# Patient Record
Sex: MALE | Race: WHITE
[De-identification: names, ages, dates, MRNs, and addresses within clinical notes are randomized per-mention and may not be internally consistent; named-entity substitution may affect disease eponyms.]

---

## 2018-04-24 ENCOUNTER — HOSPITAL ENCOUNTER (EMERGENCY)
Dept: HOSPITAL 45 - C.EDB | Age: 78
Discharge: HOME | End: 2018-04-24
Payer: COMMERCIAL

## 2018-04-24 VITALS
HEIGHT: 67.99 IN | HEIGHT: 67.99 IN | BODY MASS INDEX: 28.23 KG/M2 | BODY MASS INDEX: 28.23 KG/M2 | WEIGHT: 186.29 LBS | WEIGHT: 186.29 LBS

## 2018-04-24 VITALS
OXYGEN SATURATION: 92 % | HEART RATE: 88 BPM | TEMPERATURE: 97.52 F | DIASTOLIC BLOOD PRESSURE: 83 MMHG | SYSTOLIC BLOOD PRESSURE: 149 MMHG

## 2018-04-24 VITALS — OXYGEN SATURATION: 84 %

## 2018-04-24 DIAGNOSIS — Z87.891: ICD-10-CM

## 2018-04-24 DIAGNOSIS — I48.91: ICD-10-CM

## 2018-04-24 DIAGNOSIS — Z79.899: ICD-10-CM

## 2018-04-24 DIAGNOSIS — I95.9: ICD-10-CM

## 2018-04-24 DIAGNOSIS — J44.9: ICD-10-CM

## 2018-04-24 DIAGNOSIS — N40.0: ICD-10-CM

## 2018-04-24 DIAGNOSIS — R09.02: Primary | ICD-10-CM

## 2018-04-24 LAB
ALBUMIN SERPL-MCNC: 3.1 GM/DL (ref 3.4–5)
ALP SERPL-CCNC: 133 U/L (ref 45–117)
ALT SERPL-CCNC: 24 U/L (ref 12–78)
AST SERPL-CCNC: 18 U/L (ref 15–37)
BASOPHILS # BLD: 0.02 K/UL (ref 0–0.2)
BASOPHILS NFR BLD: 0.2 %
BUN SERPL-MCNC: 35 MG/DL (ref 7–18)
CALCIUM SERPL-MCNC: 8.8 MG/DL (ref 8.5–10.1)
CO2 SERPL-SCNC: 37 MMOL/L (ref 21–32)
CREAT SERPL-MCNC: 1.79 MG/DL (ref 0.6–1.4)
EOS ABS #: 0.21 K/UL (ref 0–0.5)
EOSINOPHIL NFR BLD AUTO: 251 K/UL (ref 130–400)
GLUCOSE SERPL-MCNC: 127 MG/DL (ref 70–99)
HCT VFR BLD CALC: 39.5 % (ref 42–52)
HGB BLD-MCNC: 12.3 G/DL (ref 14–18)
IG#: 0.04 K/UL (ref 0–0.02)
IMM GRANULOCYTES NFR BLD AUTO: 12.6 %
INR PPP: 3.5 (ref 0.9–1.1)
LIPASE: 107 U/L (ref 73–393)
LYMPHOCYTES # BLD: 1.48 K/UL (ref 1.2–3.4)
MCH RBC QN AUTO: 29.8 PG (ref 25–34)
MCHC RBC AUTO-ENTMCNC: 31.1 G/DL (ref 32–36)
MCV RBC AUTO: 95.6 FL (ref 80–100)
MONO ABS #: 0.9 K/UL (ref 0.11–0.59)
MONOCYTES NFR BLD: 7.7 %
NEUT ABS #: 9.05 K/UL (ref 1.4–6.5)
NEUTROPHILS # BLD AUTO: 1.8 %
NEUTROPHILS NFR BLD AUTO: 77.4 %
PMV BLD AUTO: 10.1 FL (ref 7.4–10.4)
POTASSIUM SERPL-SCNC: 3.6 MMOL/L (ref 3.5–5.1)
PROT SERPL-MCNC: 6.8 GM/DL (ref 6.4–8.2)
PTT PATIENT: 36.3 SECONDS (ref 21–31)
RED CELL DISTRIBUTION WIDTH CV: 14.8 % (ref 11.5–14.5)
RED CELL DISTRIBUTION WIDTH SD: 51.9 FL (ref 36.4–46.3)
SODIUM SERPL-SCNC: 140 MMOL/L (ref 136–145)
WBC # BLD AUTO: 11.7 K/UL (ref 4.8–10.8)

## 2018-04-24 NOTE — EMERGENCY ROOM VISIT NOTE
History


Report prepared by Donna:  Faizan Guzmán


Under the Supervision of:  Dr. Marlon Rogers M.D.


First contact with patient:  16:53


Stated Complaint:  HYPOTENSION, HYPOXIA,





History of Present Illness


The patient is a 78 year old male with a history of a heart attack, COPD, and 

atrial fibrillation who presents to the Emergency Room with complaints of 

persistent abdominal pain that started a week ago. He states that the pain is 

in the lower region of his abdomen, and he describes it as a "sickening pain". 

He also describes the pain as an achiness. The patient adds that he has had 

some dizzy and lightheaded spells today, and [facility], and was told that he had a fever. He was also told that he was 

hypotensive, and the patient was then sent here for evaluation. He says that he 

has had black stool intermittently, and he has noted some blood in his stool at 

times but does not remember when. The patient denies any loss of consciousness, 

or vomiting. He also denies any chest pain or worsening shortness of breath. He 

notes that he is on Coumadin. The patient states that he is on 2 to 4 liters of 

oxygen normally. He says that he no longer smokes.





   Source of History:  patient


   Onset:  A week ago


   Position:  abdomen (lower)


   Quality:  ache


   Timing:  other (persistent)


   Associated Symptoms:  + fevers, + melena, + hematochezia, No LOC, No chest 

pain, No SOB (any worsening), No vomiting


Note:


Associated symptoms: Dizzy, lightheaded today. Hypotensive.





Review of Systems


See HPI for pertinent positives & negatives. A total of 10 systems reviewed and 

were otherwise negative.





Past Medical & Surgical


Medical Problems:


(1) Afib


(2) COPD (chronic obstructive pulmonary disease)


(3) Heart attack








Family History


Family history omitted secondary to patient's advanced age.





Social History


Smoking Status:  Former Smoker


Smokeless Tobacco Use:  No


Drug Use:  none


Occupation Status:  retired





Physical Exam


Vital Signs











  Date Time  Temp Pulse Resp B/P (MAP) Pulse Ox O2 Delivery O2 Flow Rate FiO2


 


4/24/18 22:46 36.4 88 23 149/83 92   


 


4/24/18 20:09  79 22 158/93    


 


4/24/18 19:03  77 28 137/90 93 Oxymask 5.0 


 


4/24/18 18:46  76 27  98   


 


4/24/18 18:31    137/90    


 


4/24/18 18:16  76 34  98   


 


4/24/18 18:02    129/83    


 


4/24/18 17:56    125/79    


 


4/24/18 17:46  76 23  96   


 


4/24/18 17:16  73 30  94   


 


4/24/18 17:02  77      


 


4/24/18 16:59     84 Nasal Cannula 3.0 


 


4/24/18 16:55 36.4 84 22 110/65 91 Oxymask 3.0 


 


4/24/18 16:47    110/65    











Physical Exam





Constitutional: Vital signs reviewed.


Eyes: Pupils are equal round reactive to light.  Conjunctiva are noninjected.  


ENT: Pharynx is clear without erythema or exudate.  Mucous membranes are moist.

  Neck supple without meningeal signs.


Respiratory: Poor air entry bilaterally. No wheezes or rales. Breath sounds are 

equal bilaterally. 


Cardiovascular: Irregularly irregular rhythm. No rubs or gallops.


GI: Suprapubic left lower quadrant tenderness. No guarding. Soft, nondistended. 

Bowel sounds are present.


Rectal: Guaiac negative brown stool.


Musculoskeletal: No peripheral edema.  No lower extremity tenderness. 


Integumentary: No cyanosis.


Neurological: The patient is awake and alert.  No focal deficits.


Psychiatric: Normal affect.





Medical Decision & Procedures


ER Provider


Diagnostic Interpretation:


Radiology results as stated below per my review and the radiologist's 

interpretation:





CT SCAN OF THE ABDOMEN AND PELVIS WITHOUT CONTRAST





CLINICAL HISTORY: lower abd pain HYPERTENSION





COMPARISON STUDY:  No previous studies for comparison. 





TECHNIQUE: CT scan of the abdomen and pelvis was performed from the lung bases


to the proximal femurs. Images are reviewed in the axial, sagittal, and coronal


planes. IV contrast was not administered for this examination.  A dose lowering


technique was utilized adhering to the principles of ALARA.








CT DOSE: 621.81 mGy.cm


FINDINGS:





Lower chest: There is pulmonary emphysema with multiple left lower lobe lung


cysts. There is a left lower lobe pleural mass versus lobular pleural thickening


measuring 3.5 cm. There is a small left pleural effusion. A nonemergent


dedicated chest CT scan is recommended in follow-up.





Liver: The unenhanced liver is normal in size, contour, and attenuation. There


is no intrahepatic biliary ductal dilatation.





Gallbladder: Unremarkable.





Spleen: Normal in size and attenuation.





Pancreas: Unremarkable.





Adrenal glands: There is a low-density 3.5 cm left adrenal mass consistent with


an adenoma. There is a 23 mm left adrenal gland adenoma





Kidneys: There are bilateral vascular calcifications. No renal calculi are


visualized. There is no hydronephrosis. No ureteral calculi are delineated.





Bowel: There are no transition zones indicate bowel obstruction. There is


colonic diverticulosis. There is no evidence of acute diverticulitis. By history


the appendix is surgically absent.





Peritoneum: There is no intraperitoneal free air or abdominal ascites.





Vasculature: Atheromatous changes are present within the aorta. There is a 3.3


cm infrarenal abdominal aortic aneurysm.





Adenopathy: None.





Pelvic viscera: The prostate is enlarged.





Skeletal structures: There is evidence for prior T12 and L2 vertebroplasty's





IMPRESSION:  


1. No evidence of bowel obstruction. No evidence of free air


2. No renal ureteral or bladder calculi identified


3. Diverticulosis. No evidence of acute diverticulitis


4. Prostamegaly


5. Bilateral adrenal adenomas


6. 3.3 cm infrarenal abdominal aortic aneurysm.


7. Emphysema with lower lobe cystic change.


8. 3.5 cm left lower lobe pleural mass versus focal pleural thickening. A


nonemergent dedicated contrast-enhanced chest CT is recommended in follow-up








Electronically signed by:  Russell Noble M.D.


4/24/2018 7:08 PM





Dictated Date/Time:  4/24/2018 7:00 PM











CHEST 2 VIEWS ROUTINE





CLINICAL HISTORY: Cough    





COMPARISON STUDY:  No previous studies for comparison.





FINDINGS: There is severe pulmonary emphysema. There are age-indeterminate


interstitial basilar opacities left greater than right. There is a 12 mm right


upper lung zone nodule and an area of fibrotic scarring.[ 





IMPRESSION: 


1. Pulmonary emphysema with age-indeterminate interstitial basilar opacities.


2. 12 mm right upper lung zone nodule in an area fibrotic scarring








Electronically signed by:  Russell Noble M.D.


4/24/2018 7:44 PM





Dictated Date/Time:  4/24/2018 7:42 PM





Laboratory Results


4/24/18 17:26








Red Blood Count 4.13, Mean Corpuscular Volume 95.6, Mean Corpuscular Hemoglobin 

29.8, Mean Corpuscular Hemoglobin Concent 31.1, Mean Platelet Volume 10.1, 

Neutrophils (%) (Auto) 77.4, Lymphocytes (%) (Auto) 12.6, Monocytes (%) (Auto) 

7.7, Eosinophils (%) (Auto) 1.8, Basophils (%) (Auto) 0.2, Neutrophils # (Auto) 

9.05, Lymphocytes # (Auto) 1.48, Monocytes # (Auto) 0.90, Eosinophils # (Auto) 

0.21, Basophils # (Auto) 0.02





4/24/18 17:26

















Test


  4/24/18


17:26 4/24/18


17:30


 


White Blood Count


  11.70 K/uL


(4.8-10.8) 


 


 


Red Blood Count


  4.13 M/uL


(4.7-6.1) 


 


 


Hemoglobin


  12.3 g/dL


(14.0-18.0) 


 


 


Hematocrit 39.5 % (42-52)  


 


Mean Corpuscular Volume


  95.6 fL


() 


 


 


Mean Corpuscular Hemoglobin


  29.8 pg


(25-34) 


 


 


Mean Corpuscular Hemoglobin


Concent 31.1 g/dl


(32-36) 


 


 


Platelet Count


  251 K/uL


(130-400) 


 


 


Mean Platelet Volume


  10.1 fL


(7.4-10.4) 


 


 


Neutrophils (%) (Auto) 77.4 %  


 


Lymphocytes (%) (Auto) 12.6 %  


 


Monocytes (%) (Auto) 7.7 %  


 


Eosinophils (%) (Auto) 1.8 %  


 


Basophils (%) (Auto) 0.2 %  


 


Neutrophils # (Auto)


  9.05 K/uL


(1.4-6.5) 


 


 


Lymphocytes # (Auto)


  1.48 K/uL


(1.2-3.4) 


 


 


Monocytes # (Auto)


  0.90 K/uL


(0.11-0.59) 


 


 


Eosinophils # (Auto)


  0.21 K/uL


(0-0.5) 


 


 


Basophils # (Auto)


  0.02 K/uL


(0-0.2) 


 


 


RDW Standard Deviation


  51.9 fL


(36.4-46.3) 


 


 


RDW Coefficient of Variation


  14.8 %


(11.5-14.5) 


 


 


Immature Granulocyte % (Auto) 0.3 %  


 


Immature Granulocyte # (Auto)


  0.04 K/uL


(0.00-0.02) 


 


 


Prothrombin Time


  36.1 SECONDS


(9.0-12.0) 


 


 


Prothromb Time International


Ratio 3.5 (0.9-1.1) 


  


 


 


Activated Partial


Thromboplast Time 36.3 SECONDS


(21.0-31.0) 


 


 


Partial Thromboplastin Ratio 1.4  


 


Anion Gap


  3.0 mmol/L


(3-11) 


 


 


Est Creatinine Clear Calc


Drug Dose 36.6 ml/min 


  


 


 


Estimated GFR (


American) 41.4 


  


 


 


Estimated GFR (Non-


American 35.8 


  


 


 


BUN/Creatinine Ratio 19.8 (10-20)  


 


Calcium Level


  8.8 mg/dl


(8.5-10.1) 


 


 


Total Bilirubin


  0.9 mg/dl


(0.2-1) 


 


 


Direct Bilirubin


  0.4 mg/dl


(0-0.2) 


 


 


Aspartate Amino Transf


(AST/SGOT) 18 U/L (15-37) 


  


 


 


Alanine Aminotransferase


(ALT/SGPT) 24 U/L (12-78) 


  


 


 


Alkaline Phosphatase


  133 U/L


() 


 


 


Total Protein


  6.8 gm/dl


(6.4-8.2) 


 


 


Albumin


  3.1 gm/dl


(3.4-5.0) 


 


 


Lipase


  107 U/L


() 


 


 


Bedside Troponin I


  


  < 0.030 ng/ml


(0-0.045)





Laboratory results as reviewed by me.





Medications Administered











 Medications


  (Trade)  Dose


 Ordered  Sig/Arian


 Route  Start Time


 Stop Time Status Last Admin


Dose Admin


 


 Albuterol/


 Ipratropium


  (Duoneb)  3 ml  NOW  STAT


 INH  4/24/18 17:06


 4/24/18 17:09 DC 4/24/18 17:51


3 ML


 


 Methylprednisolone


 Sodium Succinate


  (Solu-Medrol IV)  125 mg  NOW  STAT


 IV  4/24/18 17:06


 4/24/18 17:09 DC 4/24/18 17:51


125 MG











ECG Per My Interpretation


Indication:  other (dizzy)


Rate (beats per minute):  77


Rhythm:  atrial flutter (with 4 to 1 AV conduction)


Findings:  other (no ST elevations, no PVCs)





ED Course


1654: The patient was evaluated in room B3B. A complete history and physical 

exam was performed.





1706: Solu-Medrol  mg, DuoNeb 3 ml INH.





1926: I reevaluated the patient and he has good air entry bilaterally on lung 

examination with no wheezing. He has no complaints currently, and his abdominal 

pain is better. He has minimal tenderness in the supraumbilical region. The 

patient's blood pressure has been normal to elevated throughout his stay here. 

I discussed the test results with him. He is going to x-ray.





2025: Upon reevaluation, the patient feels better and wants to go home. I 

discussed tawnya's findings with him. He verbalized agreement of the treatment 

plan. He was discharged home.





2033: I reevaluated the patient and talked to him about his INR. He says that 

he has an appointment either tomorrow or Thursday with his doctor.





Medical Decision


This is a 78-year-old male presents with lower abdominal pain, fever and black 

stools.  Differential diagnosis includes diverticulosis, diverticulitis, abscess

, mass, GI bleed, supratherapeutic INR.  I did perform a limited focused review 

of portions of the patient's old chart on the electronic medical record. The 

patient has had no prior visits.





I did evaluate the patient as noted above.  The patient is presenting with 

lower abdominal pain.  He also states that he has had intermittent black 

stools.  I did perform a guaiac examination on him.  His stool was light brown 

and had no blood and was guaiac negative.  [facility] also [facility] to [facility] but he is not hypertensive here.  He is actually 

hypertensive.  His oxygen level was low but he is on oxygen daily and his pulse 

ox was within normal limits on oxygen here.  He is wheezing and was given a 

DuoNeb as well as Solu-Medrol IV.  IV access was established.  The patient was 

placed on a continuous cardiac monitor.  I did order and personally review the 

patient's 12-lead EKG and chest x-ray as described above.  Chest x-ray does not 

show any evidence of pneumonia.  I did order and review the patient's blood 

work as noted in the electronic medical record.  His white blood cell count is 

slightly elevated.  He has a slightly elevated creatinine as well.  I do not 

have a baseline to compare this with.  He thinks that he has had problems with 

his kidneys in the past.  His INR is supratherapeutic at 3.5.  I did order a CT 

of the abdomen and pelvis.  I did review the images myself as well as the 

radiology report as described above.  There is no evidence of acute abnormality 

in the abdomen.  No diverticulitis.  He does have infrarenal aortic aneurysm 

measuring 3.3 cm as well as a pulmonary nodule.  I did reassess the patient.  I 

did discuss the test results with him in detail including incidental findings.  

He is feeling much better at this time.  His lungs are clear to auscultation 

with good air entry bilaterally.  He does wish to go home.  I did recommend 

close follow-up with his doctor for further evaluation as the cause of his 

abdominal pain is unclear.  He has an appointment tomorrow or the next day with 

his doctor and will discuss his Coumadin dosing given his supratherapeutic INR 

as well.  He was discharged in good condition.  He was also referred to Dr. Perales due to his aneurysm.





Medication Reconcilliation


Current Medication List:  was personally reviewed by me





Blood Pressure Screening


Patient's blood pressure:  Normal blood pressure





Impression





 Primary Impression:  


 Lower abdominal pain


 Additional Impressions:  


 COPD exacerbation


 Supratherapeutic INR


 Abdominal aortic aneurysm





Scribe Attestation


The scribe's documentation has been prepared under my direct and personally 

reviewed by me in its entirety. I confirm that the note above accurately 

reflects all work, treatment, procedures, and medical decision making performed 

by me.





Departure Information


Dispostion


Home / Self-Care





Referrals


No Doctor, Assigned (PCP)





Patient Instructions


Aneurysm Abdominal Aortic, COPD Dc, ED Abdominal Pain Unkn Cause, My [facility]





Additional Instructions





You have been examined and treated today on an emergency basis only. This is 

not a substitute for, or an effort to provide, complete comprehensive medical 

care. It is impossible to recognize and treat all injuries or illnesses in a 

single emergency department visit. It is therefore important that you follow up 

closely with your physician and Dr. Perales of vascular surgery regarding your 

aneurysm.  Call as soon as possible for an appointment.  Return for worsening 

symptoms or if you develop fever, vomiting, chest discomfort or any other 

concerning symptoms.  Your INR was 3.5 today.  This is slightly elevated and 

should be in the 2.5-3 range.  Talk to your doctor about your Coumadin dosing.





Problem Qualifiers








 Additional Impressions:  


 Abdominal aortic aneurysm


 Presence of rupture:  without rupture  Qualified Codes:  I71.4 - Abdominal 

aortic aneurysm, without rupture

## 2018-04-24 NOTE — DIAGNOSTIC IMAGING REPORT
CT SCAN OF THE ABDOMEN AND PELVIS WITHOUT CONTRAST



CLINICAL HISTORY: lower abd pain HYPERTENSION



COMPARISON STUDY:  No previous studies for comparison. 



TECHNIQUE: CT scan of the abdomen and pelvis was performed from the lung bases

to the proximal femurs. Images are reviewed in the axial, sagittal, and coronal

planes. IV contrast was not administered for this examination.  A dose lowering

technique was utilized adhering to the principles of ALARA.





CT DOSE: 621.81 mGy.cm

FINDINGS:



Lower chest: There is pulmonary emphysema with multiple left lower lobe lung

cysts. There is a left lower lobe pleural mass versus lobular pleural thickening

measuring 3.5 cm. There is a small left pleural effusion. A nonemergent

dedicated chest CT scan is recommended in follow-up.



Liver: The unenhanced liver is normal in size, contour, and attenuation. There

is no intrahepatic biliary ductal dilatation.



Gallbladder: Unremarkable.



Spleen: Normal in size and attenuation.



Pancreas: Unremarkable.



Adrenal glands: There is a low-density 3.5 cm left adrenal mass consistent with

an adenoma. There is a 23 mm left adrenal gland adenoma



Kidneys: There are bilateral vascular calcifications. No renal calculi are

visualized. There is no hydronephrosis. No ureteral calculi are delineated.



Bowel: There are no transition zones indicate bowel obstruction. There is

colonic diverticulosis. There is no evidence of acute diverticulitis. By history

the appendix is surgically absent.



Peritoneum: There is no intraperitoneal free air or abdominal ascites.



Vasculature: Atheromatous changes are present within the aorta. There is a 3.3

cm infrarenal abdominal aortic aneurysm.



Adenopathy: None.



Pelvic viscera: The prostate is enlarged.



Skeletal structures: There is evidence for prior T12 and L2 vertebroplasty's



IMPRESSION:  

1. No evidence of bowel obstruction. No evidence of free air

2. No renal ureteral or bladder calculi identified

3. Diverticulosis. No evidence of acute diverticulitis

4. Prostamegaly

5. Bilateral adrenal adenomas

6. 3.3 cm infrarenal abdominal aortic aneurysm.

7. Emphysema with lower lobe cystic change.

8. 3.5 cm left lower lobe pleural mass versus focal pleural thickening. A

nonemergent dedicated contrast-enhanced chest CT is recommended in follow-up







Electronically signed by:  Russell Noble M.D.

4/24/2018 7:08 PM



Dictated Date/Time:  4/24/2018 7:00 PM

## 2018-04-24 NOTE — DIAGNOSTIC IMAGING REPORT
CHEST 2 VIEWS ROUTINE



CLINICAL HISTORY: Cough    



COMPARISON STUDY:  No previous studies for comparison.



FINDINGS: There is severe pulmonary emphysema. There are age-indeterminate

interstitial basilar opacities left greater than right. There is a 12 mm right

upper lung zone nodule and an area of fibrotic scarring.[ 



IMPRESSION: 

1. Pulmonary emphysema with age-indeterminate interstitial basilar opacities.

2. 12 mm right upper lung zone nodule in an area fibrotic scarring







Electronically signed by:  Russell Noble M.D.

4/24/2018 7:44 PM



Dictated Date/Time:  4/24/2018 7:42 PM